# Patient Record
Sex: MALE | Race: WHITE | NOT HISPANIC OR LATINO | Employment: OTHER | URBAN - METROPOLITAN AREA
[De-identification: names, ages, dates, MRNs, and addresses within clinical notes are randomized per-mention and may not be internally consistent; named-entity substitution may affect disease eponyms.]

---

## 2018-05-15 ENCOUNTER — ESTABLISHED COMPREHENSIVE EXAM (OUTPATIENT)
Dept: URBAN - METROPOLITAN AREA CLINIC 43 | Facility: CLINIC | Age: 68
End: 2018-05-15

## 2018-05-15 DIAGNOSIS — Z96.1: ICD-10-CM

## 2018-05-15 DIAGNOSIS — E11.9: ICD-10-CM

## 2018-05-15 DIAGNOSIS — H26.493: ICD-10-CM

## 2018-05-15 PROCEDURE — 1036F TOBACCO NON-USER: CPT

## 2018-05-15 PROCEDURE — G8428 CUR MEDS NOT DOCUMENT: HCPCS

## 2018-05-15 PROCEDURE — 2022F DILAT RTA XM EVC RTNOPTHY: CPT

## 2018-05-15 PROCEDURE — 92015 DETERMINE REFRACTIVE STATE: CPT

## 2018-05-15 PROCEDURE — 92014 COMPRE OPH EXAM EST PT 1/>: CPT

## 2018-05-15 ASSESSMENT — VISUAL ACUITY
OS_SC: 20/30+2
OD_SC: 20/100
OD_SC: J1
OS_SC: J10

## 2018-05-15 ASSESSMENT — TONOMETRY
OD_IOP_MMHG: 11
OS_IOP_MMHG: 13

## 2018-07-30 ENCOUNTER — CONSULT (OUTPATIENT)
Dept: URBAN - METROPOLITAN AREA CLINIC 43 | Facility: CLINIC | Age: 68
End: 2018-07-30

## 2018-07-30 DIAGNOSIS — H35.30: ICD-10-CM

## 2018-07-30 DIAGNOSIS — H33.001: ICD-10-CM

## 2018-07-30 DIAGNOSIS — H33.312: ICD-10-CM

## 2018-07-30 DIAGNOSIS — E11.9: ICD-10-CM

## 2018-07-30 PROCEDURE — 3072F LOW RISK FOR RETINOPATHY: CPT

## 2018-07-30 PROCEDURE — 9222550 BILAT EXTENDED OPHTHALMOSCOPY, FIRST

## 2018-07-30 PROCEDURE — 2022F DILAT RTA XM EVC RTNOPTHY: CPT

## 2018-07-30 PROCEDURE — 99215 OFFICE O/P EST HI 40 MIN: CPT

## 2018-07-30 PROCEDURE — 76512 OPH US DX B-SCAN: CPT

## 2018-07-30 PROCEDURE — 92134 CPTRZ OPH DX IMG PST SGM RTA: CPT

## 2018-07-30 PROCEDURE — G8756 NO BP MEASURE DOC: HCPCS

## 2018-07-30 PROCEDURE — 2019F DILATED MACUL EXAM DONE: CPT

## 2018-07-30 PROCEDURE — G8427 DOCREV CUR MEDS BY ELIG CLIN: HCPCS

## 2018-07-30 PROCEDURE — 1036F TOBACCO NON-USER: CPT

## 2018-07-30 PROCEDURE — 4177F TALK PT/CRGVR RE AREDS PREV: CPT

## 2018-07-30 PROCEDURE — 92250 FUNDUS PHOTOGRAPHY W/I&R: CPT

## 2018-07-30 ASSESSMENT — VISUAL ACUITY
OS_SC: 20/25-2
OS_SC: J12

## 2018-07-30 ASSESSMENT — TONOMETRY
OS_IOP_MMHG: 14
OD_IOP_MMHG: 18

## 2018-08-02 ENCOUNTER — SURGERY/PROCEDURE (OUTPATIENT)
Dept: URBAN - METROPOLITAN AREA CLINIC 43 | Facility: CLINIC | Age: 68
End: 2018-08-02

## 2018-08-02 DIAGNOSIS — H33.021: ICD-10-CM

## 2018-08-02 PROCEDURE — 67113 REPAIR RETINAL DETACH CPLX: CPT

## 2018-08-03 ENCOUNTER — 1 DAY POST-OP (OUTPATIENT)
Dept: URBAN - METROPOLITAN AREA CLINIC 43 | Facility: CLINIC | Age: 68
End: 2018-08-03

## 2018-08-03 DIAGNOSIS — E11.9: ICD-10-CM

## 2018-08-03 DIAGNOSIS — H35.30: ICD-10-CM

## 2018-08-03 DIAGNOSIS — H33.001: ICD-10-CM

## 2018-08-03 DIAGNOSIS — Z96.1: ICD-10-CM

## 2018-08-03 DIAGNOSIS — H33.312: ICD-10-CM

## 2018-08-03 DIAGNOSIS — H26.493: ICD-10-CM

## 2018-08-03 PROCEDURE — 99024 POSTOP FOLLOW-UP VISIT: CPT

## 2018-08-03 RX ORDER — PREDNISOLONE ACETATE 10 MG/ML
1 SUSPENSION/ DROPS OPHTHALMIC
Start: 2018-08-03

## 2018-08-03 RX ORDER — CYCLOPENTOLATE HYDROCHLORIDE 10 MG/ML
1 SOLUTION/ DROPS OPHTHALMIC TWICE A DAY
Start: 2018-08-03

## 2018-08-03 RX ORDER — MOXIFLOXACIN OPHTHALMIC 5 MG/ML
1 SOLUTION/ DROPS OPHTHALMIC
Start: 2018-08-03

## 2018-08-03 ASSESSMENT — TONOMETRY: OD_IOP_MMHG: 14

## 2018-08-10 ENCOUNTER — POST-OP (OUTPATIENT)
Dept: URBAN - METROPOLITAN AREA CLINIC 43 | Facility: CLINIC | Age: 68
End: 2018-08-10

## 2018-08-10 DIAGNOSIS — Z98.890: ICD-10-CM

## 2018-08-10 DIAGNOSIS — Z96.1: ICD-10-CM

## 2018-08-10 DIAGNOSIS — E11.9: ICD-10-CM

## 2018-08-10 DIAGNOSIS — H35.30: ICD-10-CM

## 2018-08-10 DIAGNOSIS — H33.312: ICD-10-CM

## 2018-08-10 DIAGNOSIS — H26.493: ICD-10-CM

## 2018-08-10 DIAGNOSIS — H33.001: ICD-10-CM

## 2018-08-10 PROCEDURE — 99024 POSTOP FOLLOW-UP VISIT: CPT

## 2018-08-10 PROCEDURE — 67145 PROPH RTA DTCHMNT PC: CPT

## 2018-08-10 ASSESSMENT — TONOMETRY: OD_IOP_MMHG: 18

## 2018-08-10 ASSESSMENT — VISUAL ACUITY: OS_SC: 20/25

## 2018-08-22 ENCOUNTER — POST-OP (OUTPATIENT)
Dept: URBAN - METROPOLITAN AREA CLINIC 43 | Facility: CLINIC | Age: 68
End: 2018-08-22

## 2018-08-22 DIAGNOSIS — Z98.890: ICD-10-CM

## 2018-08-22 DIAGNOSIS — H35.30: ICD-10-CM

## 2018-08-22 PROCEDURE — 99024 POSTOP FOLLOW-UP VISIT: CPT

## 2018-08-22 PROCEDURE — 92134 CPTRZ OPH DX IMG PST SGM RTA: CPT

## 2018-08-22 RX ORDER — ERYTHROMYCIN 5 MG/G
OINTMENT OPHTHALMIC EVERY EVENING
Start: 2018-08-22

## 2018-09-07 ENCOUNTER — POST-OP (OUTPATIENT)
Dept: URBAN - METROPOLITAN AREA CLINIC 43 | Facility: CLINIC | Age: 68
End: 2018-09-07

## 2018-09-07 DIAGNOSIS — Z98.890: ICD-10-CM

## 2018-09-07 PROCEDURE — 99024 POSTOP FOLLOW-UP VISIT: CPT

## 2018-09-07 ASSESSMENT — VISUAL ACUITY
OS_SC: 20/25-1
OD_SC: 20/400
OD_PH: 20/200

## 2018-09-07 ASSESSMENT — TONOMETRY: OD_IOP_MMHG: 12

## 2018-09-28 ENCOUNTER — POST-OP (OUTPATIENT)
Dept: URBAN - METROPOLITAN AREA CLINIC 43 | Facility: CLINIC | Age: 68
End: 2018-09-28

## 2018-09-28 DIAGNOSIS — Z98.890: ICD-10-CM

## 2018-09-28 PROCEDURE — 92226 OPHTHALMOSCOPY (SUB): CPT

## 2018-09-28 PROCEDURE — 92134 CPTRZ OPH DX IMG PST SGM RTA: CPT

## 2018-09-28 PROCEDURE — 99024 POSTOP FOLLOW-UP VISIT: CPT

## 2018-09-28 ASSESSMENT — VISUAL ACUITY
OD_PH: 20/100
OD_SC: 20/400

## 2018-09-28 ASSESSMENT — TONOMETRY: OD_IOP_MMHG: 10

## 2018-10-19 ENCOUNTER — POST-OP (OUTPATIENT)
Dept: URBAN - METROPOLITAN AREA CLINIC 43 | Facility: CLINIC | Age: 68
End: 2018-10-19

## 2018-10-19 DIAGNOSIS — Z98.890: ICD-10-CM

## 2018-10-19 PROCEDURE — 99024 POSTOP FOLLOW-UP VISIT: CPT

## 2018-10-19 PROCEDURE — 92134 CPTRZ OPH DX IMG PST SGM RTA: CPT

## 2018-10-19 PROCEDURE — 92226 OPHTHALMOSCOPY (SUB): CPT

## 2018-10-19 ASSESSMENT — VISUAL ACUITY
OS_SC: 20/30-2
OD_SC: 20/200
OD_PH: 20/70

## 2018-10-19 ASSESSMENT — TONOMETRY: OD_IOP_MMHG: 10

## 2018-11-19 ENCOUNTER — FOLLOW UP (OUTPATIENT)
Dept: URBAN - METROPOLITAN AREA CLINIC 43 | Facility: CLINIC | Age: 68
End: 2018-11-19

## 2018-11-19 DIAGNOSIS — H35.30: ICD-10-CM

## 2018-11-19 DIAGNOSIS — H35.361: ICD-10-CM

## 2018-11-19 DIAGNOSIS — E11.9: ICD-10-CM

## 2018-11-19 PROCEDURE — 3072F LOW RISK FOR RETINOPATHY: CPT

## 2018-11-19 PROCEDURE — 1036F TOBACCO NON-USER: CPT

## 2018-11-19 PROCEDURE — 92134 CPTRZ OPH DX IMG PST SGM RTA: CPT

## 2018-11-19 PROCEDURE — G8756 NO BP MEASURE DOC: HCPCS

## 2018-11-19 PROCEDURE — G8428 CUR MEDS NOT DOCUMENT: HCPCS

## 2018-11-19 PROCEDURE — 92012 INTRM OPH EXAM EST PATIENT: CPT

## 2018-11-19 PROCEDURE — 2019F DILATED MACUL EXAM DONE: CPT

## 2018-11-19 PROCEDURE — 4177F TALK PT/CRGVR RE AREDS PREV: CPT

## 2018-11-19 PROCEDURE — 9222650 BILAT EXTENDED OPHTHALMOSCOPY, F/U

## 2018-11-19 PROCEDURE — G9903 PT SCRN TBCO ID AS NON USER: HCPCS

## 2018-11-19 ASSESSMENT — TONOMETRY
OD_IOP_MMHG: 12
OS_IOP_MMHG: 13

## 2018-11-19 ASSESSMENT — VISUAL ACUITY
OD_PH: 20/60-2
OD_SC: 20/200
OS_SC: 20/20-1

## 2018-11-26 ENCOUNTER — REFRACTION ONLY (OUTPATIENT)
Dept: URBAN - METROPOLITAN AREA CLINIC 43 | Facility: CLINIC | Age: 68
End: 2018-11-26

## 2018-11-26 DIAGNOSIS — Z98.890: ICD-10-CM

## 2018-11-26 PROCEDURE — 66999PO NON CO-MANAGED OTHER SURGERY PO

## 2018-11-26 ASSESSMENT — VISUAL ACUITY
OD_SC: 20/400
OS_SC: 20/25-1

## 2018-12-11 NOTE — PATIENT DISCUSSION
pt will consider SLT and will call if wants to proceed otherwise pt to continue with Lumigan, recommend pt be seen every 6 months.

## 2018-12-20 NOTE — PROCEDURE NOTE: CLINICAL
PROCEDURE NOTE: SLT OD. Diagnosis: POAG, Indeterminate. Anesthesia: Topical. Prep: Alphagan 0.15%. Prior to treatment, risks/benefits/alternatives discussed including infection, loss of vision, hemorrhage, cataract, glaucoma, retinal tears or detachment. Lens:  SLT laser lens with goniosol. Power: 1.2mJ. Total applications: 726. Application 253 degrees. Patient tolerated procedure well. There were no complications. Post-op instructions given. Post-op IOP = 10 mmHg. Yasmany Perez

## 2018-12-31 ENCOUNTER — RX CHANGE - NO APPT (OUTPATIENT)
Dept: URBAN - METROPOLITAN AREA CLINIC 43 | Facility: CLINIC | Age: 68
End: 2018-12-31

## 2018-12-31 DIAGNOSIS — E11.9: ICD-10-CM

## 2018-12-31 DIAGNOSIS — Z96.1: ICD-10-CM

## 2018-12-31 DIAGNOSIS — H26.493: ICD-10-CM

## 2018-12-31 PROCEDURE — 92015GRNC REFRACTION GLASSES RECHECK - NO CHARGE

## 2018-12-31 ASSESSMENT — VISUAL ACUITY
OS_CC: 20/40+1
OD_CC: 20/50

## 2019-01-24 NOTE — PROCEDURE NOTE: CLINICAL
PROCEDURE NOTE: SLT OS. Diagnosis: POAG, Indeterminate. Anesthesia: Topical. Prep: Alphagan 0.15%. Prior to treatment, risks/benefits/alternatives discussed including infection, loss of vision, hemorrhage, cataract, glaucoma, retinal tears or detachment. Lens:  SLT laser lens with goniosol. Power: 156mJ. Total applications: 1.2. Application 594 degrees. Patient tolerated procedure well. There were no complications. Post-op instructions given. Post-op IOP = 360 mmHg. Leigh Garcia

## 2019-02-18 ENCOUNTER — FOLLOW UP (OUTPATIENT)
Dept: URBAN - METROPOLITAN AREA CLINIC 43 | Facility: CLINIC | Age: 69
End: 2019-02-18

## 2019-02-18 DIAGNOSIS — H33.312: ICD-10-CM

## 2019-02-18 DIAGNOSIS — H35.373: ICD-10-CM

## 2019-02-18 DIAGNOSIS — H35.361: ICD-10-CM

## 2019-02-18 DIAGNOSIS — E11.9: ICD-10-CM

## 2019-02-18 DIAGNOSIS — H35.30: ICD-10-CM

## 2019-02-18 DIAGNOSIS — H33.001: ICD-10-CM

## 2019-02-18 PROCEDURE — 92014 COMPRE OPH EXAM EST PT 1/>: CPT

## 2019-02-18 PROCEDURE — 9222650 BILAT EXTENDED OPHTHALMOSCOPY, F/U

## 2019-02-18 PROCEDURE — 92134 CPTRZ OPH DX IMG PST SGM RTA: CPT

## 2019-02-18 ASSESSMENT — VISUAL ACUITY
OS_CC: 20/25-1
OD_CC: 20/30-1

## 2019-02-18 ASSESSMENT — TONOMETRY: OD_IOP_MMHG: 11

## 2019-04-03 ENCOUNTER — EST. PATIENT EMERGENCY (OUTPATIENT)
Dept: URBAN - METROPOLITAN AREA CLINIC 43 | Facility: CLINIC | Age: 69
End: 2019-04-03

## 2019-04-03 DIAGNOSIS — H35.371: ICD-10-CM

## 2019-04-03 DIAGNOSIS — H35.341: ICD-10-CM

## 2019-04-03 DIAGNOSIS — H35.372: ICD-10-CM

## 2019-04-03 DIAGNOSIS — H10.11: ICD-10-CM

## 2019-04-03 DIAGNOSIS — H35.361: ICD-10-CM

## 2019-04-03 DIAGNOSIS — E11.9: ICD-10-CM

## 2019-04-03 PROCEDURE — 92134 CPTRZ OPH DX IMG PST SGM RTA: CPT

## 2019-04-03 PROCEDURE — 92012 INTRM OPH EXAM EST PATIENT: CPT

## 2019-04-03 ASSESSMENT — VISUAL ACUITY
OS_CC: 20/20-1
OD_CC: 20/25+2

## 2019-04-03 ASSESSMENT — TONOMETRY
OD_IOP_MMHG: 08
OS_IOP_MMHG: 06

## 2019-04-19 ENCOUNTER — EST. PATIENT EMERGENCY (OUTPATIENT)
Dept: URBAN - METROPOLITAN AREA CLINIC 43 | Facility: CLINIC | Age: 69
End: 2019-04-19

## 2019-04-19 DIAGNOSIS — H35.341: ICD-10-CM

## 2019-04-19 DIAGNOSIS — G51.0: ICD-10-CM

## 2019-04-19 PROCEDURE — 92012 INTRM OPH EXAM EST PATIENT: CPT

## 2019-04-19 ASSESSMENT — VISUAL ACUITY
OD_SC: 20/200
OS_SC: 20/30-1

## 2019-05-10 ENCOUNTER — FOLLOW UP (OUTPATIENT)
Dept: URBAN - METROPOLITAN AREA CLINIC 43 | Facility: CLINIC | Age: 69
End: 2019-05-10

## 2019-05-10 DIAGNOSIS — G51.0: ICD-10-CM

## 2019-05-10 DIAGNOSIS — H35.341: ICD-10-CM

## 2019-05-10 PROCEDURE — 92012 INTRM OPH EXAM EST PATIENT: CPT

## 2019-05-10 ASSESSMENT — TONOMETRY
OS_IOP_MMHG: 16
OD_IOP_MMHG: 15.5

## 2019-05-10 ASSESSMENT — VISUAL ACUITY
OS_SC: 20/30+2
OD_SC: 20/400
OD_CC: 20/40-2
OS_CC: 20/30+2

## 2019-05-30 ENCOUNTER — ESTABLISHED COMPREHENSIVE EXAM (OUTPATIENT)
Dept: URBAN - METROPOLITAN AREA CLINIC 43 | Facility: CLINIC | Age: 69
End: 2019-05-30

## 2019-05-30 DIAGNOSIS — H35.341: ICD-10-CM

## 2019-05-30 DIAGNOSIS — H26.493: ICD-10-CM

## 2019-05-30 DIAGNOSIS — H35.372: ICD-10-CM

## 2019-05-30 DIAGNOSIS — H35.30: ICD-10-CM

## 2019-05-30 DIAGNOSIS — H33.312: ICD-10-CM

## 2019-05-30 DIAGNOSIS — H35.361: ICD-10-CM

## 2019-05-30 DIAGNOSIS — Z96.1: ICD-10-CM

## 2019-05-30 DIAGNOSIS — H35.371: ICD-10-CM

## 2019-05-30 DIAGNOSIS — E11.9: ICD-10-CM

## 2019-05-30 PROCEDURE — 92014 COMPRE OPH EXAM EST PT 1/>: CPT

## 2019-05-30 PROCEDURE — 92015 DETERMINE REFRACTIVE STATE: CPT

## 2019-05-30 ASSESSMENT — TONOMETRY
OS_IOP_MMHG: 14
OD_IOP_MMHG: 13

## 2019-05-30 ASSESSMENT — VISUAL ACUITY
OD_SC: 20/100
OS_SC: 20/30+1
OS_SC: J8
OD_SC: J12
OS_CC: 20/25-2
OD_CC: 20/30-3
OD_CC: J6
OS_CC: J1

## 2019-07-10 ENCOUNTER — FOLLOW UP (OUTPATIENT)
Dept: URBAN - METROPOLITAN AREA CLINIC 43 | Facility: CLINIC | Age: 69
End: 2019-07-10

## 2019-07-10 DIAGNOSIS — H35.371: ICD-10-CM

## 2019-07-10 DIAGNOSIS — H53.2: ICD-10-CM

## 2019-07-10 DIAGNOSIS — H35.372: ICD-10-CM

## 2019-07-10 DIAGNOSIS — H35.361: ICD-10-CM

## 2019-07-10 DIAGNOSIS — E11.9: ICD-10-CM

## 2019-07-10 DIAGNOSIS — H35.341: ICD-10-CM

## 2019-07-10 PROCEDURE — 92134 CPTRZ OPH DX IMG PST SGM RTA: CPT

## 2019-07-10 PROCEDURE — 92060 SENSORIMOTOR EXAMINATION: CPT

## 2019-07-10 PROCEDURE — 92250 FUNDUS PHOTOGRAPHY W/I&R: CPT

## 2019-07-10 PROCEDURE — 92014 COMPRE OPH EXAM EST PT 1/>: CPT

## 2019-07-10 ASSESSMENT — VISUAL ACUITY
OD_CC: 20/25
OS_CC: 20/20-1

## 2019-07-10 ASSESSMENT — TONOMETRY
OS_IOP_MMHG: 10
OD_IOP_MMHG: 11

## 2019-07-22 ENCOUNTER — REFRACTION ONLY (OUTPATIENT)
Dept: URBAN - METROPOLITAN AREA CLINIC 43 | Facility: CLINIC | Age: 69
End: 2019-07-22

## 2019-07-22 DIAGNOSIS — H53.2: ICD-10-CM

## 2019-07-22 DIAGNOSIS — H50.10: ICD-10-CM

## 2019-07-22 PROCEDURE — 92012 INTRM OPH EXAM EST PATIENT: CPT

## 2019-07-22 PROCEDURE — 92060 SENSORIMOTOR EXAMINATION: CPT

## 2019-07-22 ASSESSMENT — VISUAL ACUITY
OD_SC: J3
OS_CC: 20/25-1
OS_SC: J10
OD_SC: 20/100
OS_SC: 20/20
OD_CC: 20/50+2

## 2019-08-20 ENCOUNTER — FOLLOW UP (OUTPATIENT)
Dept: URBAN - METROPOLITAN AREA CLINIC 43 | Facility: CLINIC | Age: 69
End: 2019-08-20

## 2019-08-20 DIAGNOSIS — H50.10: ICD-10-CM

## 2019-08-20 DIAGNOSIS — H53.2: ICD-10-CM

## 2019-08-20 PROCEDURE — 92015GRNC REFRACTION GLASSES RECHECK - NO CHARGE

## 2019-08-20 ASSESSMENT — VISUAL ACUITY: OD_CC: 20/40 SUN RX

## 2020-01-13 ENCOUNTER — ESTABLISHED COMPREHENSIVE EXAM (OUTPATIENT)
Dept: URBAN - METROPOLITAN AREA CLINIC 43 | Facility: CLINIC | Age: 70
End: 2020-01-13

## 2020-01-13 DIAGNOSIS — H35.30: ICD-10-CM

## 2020-01-13 DIAGNOSIS — H35.341: ICD-10-CM

## 2020-01-13 DIAGNOSIS — H35.372: ICD-10-CM

## 2020-01-13 DIAGNOSIS — H35.361: ICD-10-CM

## 2020-01-13 DIAGNOSIS — H35.371: ICD-10-CM

## 2020-01-13 DIAGNOSIS — E11.9: ICD-10-CM

## 2020-01-13 PROCEDURE — 92134 CPTRZ OPH DX IMG PST SGM RTA: CPT

## 2020-01-13 PROCEDURE — 92250 FUNDUS PHOTOGRAPHY W/I&R: CPT

## 2020-01-13 PROCEDURE — 92201 OPSCPY EXTND RTA DRAW UNI/BI: CPT

## 2020-01-13 PROCEDURE — 92014 COMPRE OPH EXAM EST PT 1/>: CPT

## 2020-01-13 ASSESSMENT — TONOMETRY
OD_IOP_MMHG: 10
OS_IOP_MMHG: 09

## 2020-01-13 ASSESSMENT — VISUAL ACUITY
OS_CC: 20/25-1
OD_CC: 20/40

## 2020-01-30 ENCOUNTER — ESTABLISHED COMPREHENSIVE EXAM (OUTPATIENT)
Dept: URBAN - METROPOLITAN AREA CLINIC 44 | Facility: CLINIC | Age: 70
End: 2020-01-30

## 2020-01-30 DIAGNOSIS — H02.831: ICD-10-CM

## 2020-01-30 DIAGNOSIS — H02.834: ICD-10-CM

## 2020-01-30 PROCEDURE — 99213 OFFICE O/P EST LOW 20 MIN: CPT

## 2020-01-30 ASSESSMENT — VISUAL ACUITY
OD_SC: 20/25
OS_SC: 20/30

## 2020-06-04 ENCOUNTER — ESTABLISHED COMPREHENSIVE EXAM (OUTPATIENT)
Dept: URBAN - METROPOLITAN AREA CLINIC 43 | Facility: CLINIC | Age: 70
End: 2020-06-04

## 2020-06-04 DIAGNOSIS — G51.0: ICD-10-CM

## 2020-06-04 DIAGNOSIS — H35.371: ICD-10-CM

## 2020-06-04 DIAGNOSIS — H35.372: ICD-10-CM

## 2020-06-04 DIAGNOSIS — Z98.890: ICD-10-CM

## 2020-06-04 DIAGNOSIS — H35.30: ICD-10-CM

## 2020-06-04 DIAGNOSIS — H35.361: ICD-10-CM

## 2020-06-04 DIAGNOSIS — Z96.1: ICD-10-CM

## 2020-06-04 DIAGNOSIS — H33.312: ICD-10-CM

## 2020-06-04 DIAGNOSIS — H53.9: ICD-10-CM

## 2020-06-04 DIAGNOSIS — E11.9: ICD-10-CM

## 2020-06-04 DIAGNOSIS — H50.10: ICD-10-CM

## 2020-06-04 DIAGNOSIS — H53.2: ICD-10-CM

## 2020-06-04 DIAGNOSIS — H35.341: ICD-10-CM

## 2020-06-04 DIAGNOSIS — H26.493: ICD-10-CM

## 2020-06-04 PROCEDURE — 92014 COMPRE OPH EXAM EST PT 1/>: CPT

## 2020-06-04 PROCEDURE — 92015 DETERMINE REFRACTIVE STATE: CPT

## 2020-06-04 ASSESSMENT — VISUAL ACUITY
OD_SC: J3
OD_SC: 20/200+2
OS_CC: J3
OS_SC: 20/25+2
OS_SC: J12
OD_CC: J1

## 2020-06-04 ASSESSMENT — TONOMETRY
OD_IOP_MMHG: 06
OS_IOP_MMHG: 12

## 2020-07-06 ENCOUNTER — ESTABLISHED COMPREHENSIVE EXAM (OUTPATIENT)
Dept: URBAN - METROPOLITAN AREA CLINIC 43 | Facility: CLINIC | Age: 70
End: 2020-07-06

## 2020-07-06 DIAGNOSIS — H35.341: ICD-10-CM

## 2020-07-06 DIAGNOSIS — E11.9: ICD-10-CM

## 2020-07-06 DIAGNOSIS — H35.30: ICD-10-CM

## 2020-07-06 DIAGNOSIS — H35.361: ICD-10-CM

## 2020-07-06 DIAGNOSIS — H35.371: ICD-10-CM

## 2020-07-06 DIAGNOSIS — H35.372: ICD-10-CM

## 2020-07-06 PROCEDURE — 92014 COMPRE OPH EXAM EST PT 1/>: CPT

## 2020-07-06 PROCEDURE — 92134 CPTRZ OPH DX IMG PST SGM RTA: CPT

## 2020-07-06 PROCEDURE — 92250 FUNDUS PHOTOGRAPHY W/I&R: CPT

## 2020-07-06 ASSESSMENT — VISUAL ACUITY
OS_SC: 20/30
OD_PH: 20/70+2
OD_SC: 20/200

## 2021-08-03 ENCOUNTER — ESTABLISHED COMPREHENSIVE EXAM (OUTPATIENT)
Dept: URBAN - METROPOLITAN AREA CLINIC 43 | Facility: CLINIC | Age: 71
End: 2021-08-03

## 2021-08-03 DIAGNOSIS — E11.9: ICD-10-CM

## 2021-08-03 DIAGNOSIS — H35.371: ICD-10-CM

## 2021-08-03 DIAGNOSIS — H33.312: ICD-10-CM

## 2021-08-03 DIAGNOSIS — Z96.1: ICD-10-CM

## 2021-08-03 DIAGNOSIS — H35.341: ICD-10-CM

## 2021-08-03 DIAGNOSIS — H26.493: ICD-10-CM

## 2021-08-03 DIAGNOSIS — H35.372: ICD-10-CM

## 2021-08-03 DIAGNOSIS — G51.0: ICD-10-CM

## 2021-08-03 PROCEDURE — 92014 COMPRE OPH EXAM EST PT 1/>: CPT

## 2021-08-03 PROCEDURE — 92134 CPTRZ OPH DX IMG PST SGM RTA: CPT

## 2021-08-03 PROCEDURE — 92015 DETERMINE REFRACTIVE STATE: CPT

## 2021-08-03 ASSESSMENT — TONOMETRY
OS_IOP_MMHG: 10
OD_IOP_MMHG: 16

## 2021-08-03 ASSESSMENT — VISUAL ACUITY
OD_CC: J1
OD_SC: 20/400
OS_SC: 20/40-2
OD_SC: J1
OD_CC: 20/50-1+1
OS_SC: J12
OS_CC: J1
OS_CC: 20/25

## 2021-08-09 ENCOUNTER — ESTABLISHED PATIENT (OUTPATIENT)
Dept: URBAN - METROPOLITAN AREA CLINIC 43 | Facility: CLINIC | Age: 71
End: 2021-08-09

## 2021-08-09 DIAGNOSIS — E11.9: ICD-10-CM

## 2021-08-09 DIAGNOSIS — H33.312: ICD-10-CM

## 2021-08-09 DIAGNOSIS — H35.341: ICD-10-CM

## 2021-08-09 DIAGNOSIS — H35.372: ICD-10-CM

## 2021-08-09 DIAGNOSIS — H35.361: ICD-10-CM

## 2021-08-09 DIAGNOSIS — H35.371: ICD-10-CM

## 2021-08-09 PROCEDURE — 99214 OFFICE O/P EST MOD 30 MIN: CPT

## 2021-08-09 PROCEDURE — 92134 CPTRZ OPH DX IMG PST SGM RTA: CPT

## 2021-08-09 PROCEDURE — 92250 FUNDUS PHOTOGRAPHY W/I&R: CPT

## 2021-08-09 ASSESSMENT — VISUAL ACUITY
OS_CC: 20/25-1
OD_CC: 20/30+2

## 2021-08-09 ASSESSMENT — TONOMETRY
OD_IOP_MMHG: 14
OS_IOP_MMHG: 13

## 2022-09-06 ENCOUNTER — COMPREHENSIVE EXAM (OUTPATIENT)
Dept: URBAN - METROPOLITAN AREA CLINIC 43 | Facility: CLINIC | Age: 72
End: 2022-09-06

## 2022-09-06 DIAGNOSIS — Z96.1: ICD-10-CM

## 2022-09-06 DIAGNOSIS — E11.9: ICD-10-CM

## 2022-09-06 DIAGNOSIS — H04.123: ICD-10-CM

## 2022-09-06 DIAGNOSIS — H35.373: ICD-10-CM

## 2022-09-06 DIAGNOSIS — H33.312: ICD-10-CM

## 2022-09-06 DIAGNOSIS — H35.341: ICD-10-CM

## 2022-09-06 DIAGNOSIS — H35.30: ICD-10-CM

## 2022-09-06 DIAGNOSIS — H26.493: ICD-10-CM

## 2022-09-06 DIAGNOSIS — Z98.890: ICD-10-CM

## 2022-09-06 DIAGNOSIS — H53.2: ICD-10-CM

## 2022-09-06 DIAGNOSIS — H35.361: ICD-10-CM

## 2022-09-06 DIAGNOSIS — H53.9: ICD-10-CM

## 2022-09-06 PROCEDURE — 92015 DETERMINE REFRACTIVE STATE: CPT

## 2022-09-06 PROCEDURE — 92014 COMPRE OPH EXAM EST PT 1/>: CPT

## 2022-09-06 ASSESSMENT — VISUAL ACUITY
OD_SC: 20/200
OD_CC: 20/25-1
OS_CC: 20/25+2
OS_SC: 20/40-1
OD_SC: J4
OS_SC: J12

## 2022-09-06 ASSESSMENT — TONOMETRY
OD_IOP_MMHG: 13
OS_IOP_MMHG: 11

## 2022-09-23 ENCOUNTER — ESTABLISHED PATIENT (OUTPATIENT)
Dept: URBAN - METROPOLITAN AREA CLINIC 43 | Facility: CLINIC | Age: 72
End: 2022-09-23

## 2022-09-23 DIAGNOSIS — H35.341: ICD-10-CM

## 2022-09-23 DIAGNOSIS — H35.373: ICD-10-CM

## 2022-09-23 DIAGNOSIS — E11.9: ICD-10-CM

## 2022-09-23 DIAGNOSIS — H33.312: ICD-10-CM

## 2022-09-23 DIAGNOSIS — H35.361: ICD-10-CM

## 2022-09-23 PROCEDURE — 92250 FUNDUS PHOTOGRAPHY W/I&R: CPT

## 2022-09-23 PROCEDURE — 99214 OFFICE O/P EST MOD 30 MIN: CPT

## 2022-09-23 ASSESSMENT — TONOMETRY
OD_IOP_MMHG: 13
OS_IOP_MMHG: 12

## 2022-09-23 ASSESSMENT — VISUAL ACUITY
OD_CC: 20/30+2
OS_CC: 20/30+2

## 2023-08-08 ENCOUNTER — COMPREHENSIVE EXAM (OUTPATIENT)
Dept: URBAN - METROPOLITAN AREA CLINIC 43 | Facility: CLINIC | Age: 73
End: 2023-08-08

## 2023-08-08 DIAGNOSIS — H35.373: ICD-10-CM

## 2023-08-08 DIAGNOSIS — E11.9: ICD-10-CM

## 2023-08-08 DIAGNOSIS — H26.493: ICD-10-CM

## 2023-08-08 DIAGNOSIS — H33.312: ICD-10-CM

## 2023-08-08 DIAGNOSIS — H53.9: ICD-10-CM

## 2023-08-08 DIAGNOSIS — Z98.890: ICD-10-CM

## 2023-08-08 DIAGNOSIS — H35.361: ICD-10-CM

## 2023-08-08 DIAGNOSIS — Z96.1: ICD-10-CM

## 2023-08-08 DIAGNOSIS — H35.30: ICD-10-CM

## 2023-08-08 DIAGNOSIS — H35.341: ICD-10-CM

## 2023-08-08 DIAGNOSIS — H53.2: ICD-10-CM

## 2023-08-08 DIAGNOSIS — H04.123: ICD-10-CM

## 2023-08-08 PROCEDURE — 92015 DETERMINE REFRACTIVE STATE: CPT

## 2023-08-08 PROCEDURE — 92014 COMPRE OPH EXAM EST PT 1/>: CPT

## 2023-08-08 ASSESSMENT — VISUAL ACUITY
OD_SC: 20/100
OS_CC: 20/25+2
OD_CC: 20/25-2
OS_SC: 20/30-2
OD_SC: J3
OS_SC: J12

## 2023-08-08 ASSESSMENT — TONOMETRY
OD_IOP_MMHG: 14
OS_IOP_MMHG: 13

## 2023-11-17 ENCOUNTER — COMPREHENSIVE EXAM (OUTPATIENT)
Dept: URBAN - METROPOLITAN AREA CLINIC 39 | Facility: CLINIC | Age: 73
End: 2023-11-17

## 2023-11-17 DIAGNOSIS — H33.312: ICD-10-CM

## 2023-11-17 DIAGNOSIS — H35.341: ICD-10-CM

## 2023-11-17 DIAGNOSIS — H35.373: ICD-10-CM

## 2023-11-17 DIAGNOSIS — H35.361: ICD-10-CM

## 2023-11-17 DIAGNOSIS — H35.30: ICD-10-CM

## 2023-11-17 DIAGNOSIS — Z98.890: ICD-10-CM

## 2023-11-17 PROCEDURE — 99214 OFFICE O/P EST MOD 30 MIN: CPT

## 2023-11-17 PROCEDURE — 92250 FUNDUS PHOTOGRAPHY W/I&R: CPT

## 2023-11-17 ASSESSMENT — TONOMETRY
OS_IOP_MMHG: 11
OD_IOP_MMHG: 17

## 2023-11-17 ASSESSMENT — VISUAL ACUITY
OS_CC: 20/20-1
OD_CC: 20/30-2+2

## 2024-07-15 ENCOUNTER — COMPREHENSIVE EXAM (OUTPATIENT)
Dept: URBAN - METROPOLITAN AREA CLINIC 43 | Facility: CLINIC | Age: 74
End: 2024-07-15

## 2024-07-15 DIAGNOSIS — H33.312: ICD-10-CM

## 2024-07-15 DIAGNOSIS — Z98.890: ICD-10-CM

## 2024-07-15 DIAGNOSIS — H35.341: ICD-10-CM

## 2024-07-15 DIAGNOSIS — H35.30: ICD-10-CM

## 2024-07-15 DIAGNOSIS — H35.361: ICD-10-CM

## 2024-07-15 DIAGNOSIS — H35.373: ICD-10-CM

## 2024-07-15 PROCEDURE — 92250 FUNDUS PHOTOGRAPHY W/I&R: CPT

## 2024-07-15 PROCEDURE — 99213 OFFICE O/P EST LOW 20 MIN: CPT

## 2024-07-15 ASSESSMENT — TONOMETRY
OS_IOP_MMHG: 08
OD_IOP_MMHG: 09

## 2024-07-15 ASSESSMENT — VISUAL ACUITY
OS_CC: 20/20
OD_CC: 20/20-1

## 2024-08-28 ENCOUNTER — COMPREHENSIVE EXAM (OUTPATIENT)
Dept: URBAN - METROPOLITAN AREA CLINIC 43 | Facility: CLINIC | Age: 74
End: 2024-08-28

## 2024-08-28 DIAGNOSIS — Z98.890: ICD-10-CM

## 2024-08-28 DIAGNOSIS — E11.9: ICD-10-CM

## 2024-08-28 DIAGNOSIS — H35.30: ICD-10-CM

## 2024-08-28 DIAGNOSIS — Z96.1: ICD-10-CM

## 2024-08-28 DIAGNOSIS — H26.493: ICD-10-CM

## 2024-08-28 DIAGNOSIS — H35.373: ICD-10-CM

## 2024-08-28 DIAGNOSIS — H35.341: ICD-10-CM

## 2024-08-28 DIAGNOSIS — H35.361: ICD-10-CM

## 2024-08-28 DIAGNOSIS — H33.312: ICD-10-CM

## 2024-08-28 PROCEDURE — 92014 COMPRE OPH EXAM EST PT 1/>: CPT

## 2024-08-28 PROCEDURE — 92015 DETERMINE REFRACTIVE STATE: CPT

## 2024-08-28 ASSESSMENT — VISUAL ACUITY
OD_CC: J2-
OD_SC: J3
OD_CC: 20/30
OS_SC: J8
OS_CC: 20/20-1
OS_CC: J1
OS_SC: 20/40-1
OD_SC: 20/100

## 2024-08-28 ASSESSMENT — TONOMETRY
OD_IOP_MMHG: 12
OS_IOP_MMHG: 10

## 2025-07-16 ENCOUNTER — COMPREHENSIVE EXAM (OUTPATIENT)
Age: 75
End: 2025-07-16

## 2025-07-16 DIAGNOSIS — H35.373: ICD-10-CM

## 2025-07-16 DIAGNOSIS — H33.312: ICD-10-CM

## 2025-07-16 DIAGNOSIS — E11.9: ICD-10-CM

## 2025-07-16 DIAGNOSIS — H35.341: ICD-10-CM

## 2025-07-16 DIAGNOSIS — H04.123: ICD-10-CM

## 2025-07-16 PROCEDURE — 92134 CPTRZ OPH DX IMG PST SGM RTA: CPT

## 2025-07-16 PROCEDURE — 92250 FUNDUS PHOTOGRAPHY W/I&R: CPT

## 2025-07-16 PROCEDURE — 99213 OFFICE O/P EST LOW 20 MIN: CPT

## 2025-08-06 ENCOUNTER — COMPREHENSIVE EXAM (OUTPATIENT)
Age: 75
End: 2025-08-06

## 2025-08-06 DIAGNOSIS — Z98.890: ICD-10-CM

## 2025-08-06 DIAGNOSIS — H35.30: ICD-10-CM

## 2025-08-06 DIAGNOSIS — H26.493: ICD-10-CM

## 2025-08-06 DIAGNOSIS — H33.312: ICD-10-CM

## 2025-08-06 DIAGNOSIS — H35.373: ICD-10-CM

## 2025-08-06 DIAGNOSIS — H04.123: ICD-10-CM

## 2025-08-06 DIAGNOSIS — Z96.1: ICD-10-CM

## 2025-08-06 DIAGNOSIS — E11.9: ICD-10-CM

## 2025-08-06 DIAGNOSIS — H53.2: ICD-10-CM

## 2025-08-06 DIAGNOSIS — H35.361: ICD-10-CM

## 2025-08-06 DIAGNOSIS — H35.341: ICD-10-CM

## 2025-08-06 DIAGNOSIS — H53.9: ICD-10-CM

## 2025-08-06 PROCEDURE — 92014 COMPRE OPH EXAM EST PT 1/>: CPT

## 2025-08-06 PROCEDURE — 92015 DETERMINE REFRACTIVE STATE: CPT
